# Patient Record
Sex: FEMALE | Race: WHITE | NOT HISPANIC OR LATINO | URBAN - METROPOLITAN AREA
[De-identification: names, ages, dates, MRNs, and addresses within clinical notes are randomized per-mention and may not be internally consistent; named-entity substitution may affect disease eponyms.]

---
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
## 2019-04-23 NOTE — PROCEDURE: FACIAL
Treatment Type Override: Clear Cell
Treatment Type (Optional): Deep Cleanse Treatment
Comments (Non-Sticky): Discussed [employer]melchor products.
Exfoliation Type: enzyme
no
Detail Level: Zone
Mask Type (Optional): cream based
Facial Steaming: steamed

## 2020-02-04 ENCOUNTER — RX ONLY (OUTPATIENT)
Age: 20
Setting detail: RX ONLY
End: 2020-02-04

## 2020-02-04 ENCOUNTER — RX ONLY (RX ONLY)
Age: 20
End: 2020-02-04

## 2020-02-04 RX ORDER — DAPSONE 75 MG/G
THIN LAYER GEL TOPICAL QAM
Qty: 1 | Refills: 0 | Status: ERX